# Patient Record
Sex: FEMALE | Race: WHITE | Employment: FULL TIME | URBAN - METROPOLITAN AREA
[De-identification: names, ages, dates, MRNs, and addresses within clinical notes are randomized per-mention and may not be internally consistent; named-entity substitution may affect disease eponyms.]

---

## 2022-02-15 ENCOUNTER — HOSPITAL ENCOUNTER (OUTPATIENT)
Dept: CT IMAGING | Age: 22
Discharge: HOME | End: 2022-02-15
Payer: COMMERCIAL

## 2022-02-15 DIAGNOSIS — R11.10 VOMITING, INTRACTABILITY OF VOMITING NOT SPECIFIED, PRESENCE OF NAUSEA NOT SPECIFIED, UNSPECIFIED VOMITING TYPE: ICD-10-CM

## 2022-02-15 DIAGNOSIS — R10.31 RIGHT LOWER QUADRANT ABDOMINAL PAIN: ICD-10-CM

## 2022-02-15 PROCEDURE — 6360000004 HC RX CONTRAST MEDICATION: Performed by: EMERGENCY MEDICINE

## 2022-02-15 PROCEDURE — 74177 CT ABD & PELVIS W/CONTRAST: CPT

## 2022-02-15 RX ADMIN — IOHEXOL 50 ML: 240 INJECTION, SOLUTION INTRATHECAL; INTRAVASCULAR; INTRAVENOUS; ORAL at 14:34

## 2022-02-15 RX ADMIN — IOPAMIDOL 75 ML: 755 INJECTION, SOLUTION INTRAVENOUS at 14:35

## 2022-03-10 ENCOUNTER — OFFICE VISIT (OUTPATIENT)
Dept: FAMILY MEDICINE CLINIC | Age: 22
End: 2022-03-10
Payer: COMMERCIAL

## 2022-03-10 VITALS
DIASTOLIC BLOOD PRESSURE: 76 MMHG | HEART RATE: 85 BPM | SYSTOLIC BLOOD PRESSURE: 118 MMHG | OXYGEN SATURATION: 95 % | WEIGHT: 198.2 LBS | HEIGHT: 63 IN | BODY MASS INDEX: 35.12 KG/M2

## 2022-03-10 DIAGNOSIS — Z76.89 ENCOUNTER TO ESTABLISH CARE: Primary | ICD-10-CM

## 2022-03-10 DIAGNOSIS — Z00.00 HEALTHCARE MAINTENANCE: ICD-10-CM

## 2022-03-10 DIAGNOSIS — Z86.69 HX OF MIGRAINES: ICD-10-CM

## 2022-03-10 PROCEDURE — 99385 PREV VISIT NEW AGE 18-39: CPT | Performed by: NURSE PRACTITIONER

## 2022-03-10 SDOH — ECONOMIC STABILITY: HOUSING INSECURITY: IN THE LAST 12 MONTHS, HOW MANY PLACES HAVE YOU LIVED?: 2

## 2022-03-10 SDOH — ECONOMIC STABILITY: FOOD INSECURITY: WITHIN THE PAST 12 MONTHS, YOU WORRIED THAT YOUR FOOD WOULD RUN OUT BEFORE YOU GOT MONEY TO BUY MORE.: NEVER TRUE

## 2022-03-10 SDOH — ECONOMIC STABILITY: INCOME INSECURITY: IN THE LAST 12 MONTHS, WAS THERE A TIME WHEN YOU WERE NOT ABLE TO PAY THE MORTGAGE OR RENT ON TIME?: NO

## 2022-03-10 SDOH — ECONOMIC STABILITY: HOUSING INSECURITY
IN THE LAST 12 MONTHS, WAS THERE A TIME WHEN YOU DID NOT HAVE A STEADY PLACE TO SLEEP OR SLEPT IN A SHELTER (INCLUDING NOW)?: NO

## 2022-03-10 SDOH — ECONOMIC STABILITY: TRANSPORTATION INSECURITY
IN THE PAST 12 MONTHS, HAS THE LACK OF TRANSPORTATION KEPT YOU FROM MEDICAL APPOINTMENTS OR FROM GETTING MEDICATIONS?: NO

## 2022-03-10 SDOH — ECONOMIC STABILITY: FOOD INSECURITY: WITHIN THE PAST 12 MONTHS, THE FOOD YOU BOUGHT JUST DIDN'T LAST AND YOU DIDN'T HAVE MONEY TO GET MORE.: NEVER TRUE

## 2022-03-10 SDOH — ECONOMIC STABILITY: TRANSPORTATION INSECURITY
IN THE PAST 12 MONTHS, HAS LACK OF TRANSPORTATION KEPT YOU FROM MEETINGS, WORK, OR FROM GETTING THINGS NEEDED FOR DAILY LIVING?: NO

## 2022-03-10 ASSESSMENT — ENCOUNTER SYMPTOMS
GASTROINTESTINAL NEGATIVE: 1
EYES NEGATIVE: 1
RESPIRATORY NEGATIVE: 1

## 2022-03-10 ASSESSMENT — PATIENT HEALTH QUESTIONNAIRE - PHQ9
1. LITTLE INTEREST OR PLEASURE IN DOING THINGS: 0
2. FEELING DOWN, DEPRESSED OR HOPELESS: 0
SUM OF ALL RESPONSES TO PHQ QUESTIONS 1-9: 0
SUM OF ALL RESPONSES TO PHQ9 QUESTIONS 1 & 2: 0
SUM OF ALL RESPONSES TO PHQ QUESTIONS 1-9: 0

## 2022-03-10 ASSESSMENT — SOCIAL DETERMINANTS OF HEALTH (SDOH): HOW HARD IS IT FOR YOU TO PAY FOR THE VERY BASICS LIKE FOOD, HOUSING, MEDICAL CARE, AND HEATING?: NOT HARD AT ALL

## 2022-03-10 NOTE — PROGRESS NOTES
3/10/2022    Elisa Gonzalez (:  2000) is a 24 y.o. female, here for a preventive medicine evaluation. Pt is here to establish care as a new pt. No complaints. Recently moved here from [place] 10 months ago. , no children. Works as a baker. Vapes. Drinks occasionally. Denies drug use. Denies family h/o colon cancer, breast cancer or heart disease. Sexually active. No using protection. Needs a new OB/Gyn. H/o migraines. Taking sumatriptan as needed. Requesting a new referral to Neurology. Patient Active Problem List   Diagnosis    Hx of migraines       Review of Systems   Constitutional: Negative. HENT: Negative. Eyes: Negative. Respiratory: Negative. Cardiovascular: Negative. Gastrointestinal: Negative. Genitourinary: Negative. Musculoskeletal: Negative. Skin: Negative. Neurological: Negative. Psychiatric/Behavioral: Negative. Prior to Visit Medications    Medication Sig Taking? Authorizing Provider   SUMATRIPTAN-ONDANSETRON PO Take by mouth Takes as needed for migraines, unsure of mg Yes Historical Provider, MD   Levalbuterol Tartrate (Madhu Inch HFA IN) Inhale into the lungs Yes Historical Provider, MD        No Known Allergies    No past medical history on file. History reviewed. No pertinent surgical history.     Social History     Socioeconomic History    Marital status:      Spouse name: Not on file    Number of children: Not on file    Years of education: Not on file    Highest education level: Not on file   Occupational History    Not on file   Tobacco Use    Smoking status: Former Smoker     Quit date: 2018     Years since quittin.1    Smokeless tobacco: Never Used   Vaping Use    Vaping Use: Every day    Substances: Nicotine   Substance and Sexual Activity    Alcohol use: Not on file    Drug use: Not on file    Sexual activity: Not on file   Other Topics Concern    Not on file   Social History Narrative    Not on file     Social Determinants of Health     Financial Resource Strain: Low Risk     Difficulty of Paying Living Expenses: Not hard at all   Food Insecurity: No Food Insecurity    Worried About Running Out of Food in the Last Year: Never true    [address] in the Last Year: Never true   Transportation Needs: No Transportation Needs    Lack of Transportation (Medical): No    Lack of Transportation (Non-Medical): No   Physical Activity:     Days of Exercise per Week: Not on file    Minutes of Exercise per Session: Not on file   Stress:     Feeling of Stress : Not on file   Social Connections:     Frequency of Communication with Friends and Family: Not on file    Frequency of Social Gatherings with Friends and Family: Not on file    Attends Denominational Services: Not on file    Active Member of [address] [place] [address] [employer] or Organizations: Not on file    Attends Club or Organization Meetings: Not on file    Marital Status: Not on file   Intimate Partner Violence:     Fear of Current or Ex-Partner: Not on file    Emotionally Abused: Not on file    Physically Abused: Not on file    Sexually Abused: Not on file   Housing Stability: [address] Unable to Pay for Housing in the Last Year: No    Number of [place] in the Last Year: 2    Unstable Housing in the Last Year: No        Family History   Problem Relation Age of Onset    Other Mother         migraines    Hypertension Mother     Cancer Maternal Grandmother         skin       ADVANCE DIRECTIVE: N, <no information>    Vitals:    03/10/22 1355   BP: 118/76   Site: Left Upper Arm   Position: Sitting   Cuff Size: Medium Adult   Pulse: 85   SpO2: 95%   Weight: 198 lb 3.2 oz (89.9 kg)   Height: 5' 2.5\" (1.588 m)     Estimated body mass index is 35.67 kg/m² as calculated from the following:    Height as of this encounter: 5' 2.5\" (1.588 m). Weight as of this encounter: 198 lb 3.2 oz (89.9 kg). Physical Exam  Vitals reviewed. Constitutional:       Appearance: Normal appearance. She is well-developed. She is not ill-appearing. HENT:      Head: Normocephalic. Eyes:      General: Lids are normal.         Right eye: No discharge. Left eye: No discharge. Neck:      Thyroid: No thyroid mass, thyromegaly or thyroid tenderness. Vascular: Normal carotid pulses. No carotid bruit or JVD. Cardiovascular:      Rate and Rhythm: Normal rate and regular rhythm. Pulses: Normal pulses. Heart sounds: Normal heart sounds. No murmur heard. Pulmonary:      Effort: Pulmonary effort is normal.      Breath sounds: Normal breath sounds. Musculoskeletal:         General: Normal range of motion. Cervical back: Full passive range of motion without pain and normal range of motion. No edema or crepitus. No pain with movement. Normal range of motion. Lymphadenopathy:      Cervical: No cervical adenopathy. Right cervical: No superficial cervical adenopathy. Left cervical: No superficial cervical adenopathy. Skin:     General: Skin is warm and dry. Capillary Refill: Capillary refill takes less than 2 seconds. Neurological:      General: No focal deficit present. Mental Status: She is alert and oriented to person, place, and time. Psychiatric:         Mood and Affect: Mood normal.         Behavior: Behavior normal. Behavior is cooperative. Thought Content: Thought content normal.         Judgment: Judgment normal.         No flowsheet data found. Lab Results   Component Value Date    GLUCOSE 80 03/10/2022       The ASCVD Risk score (Grace Reyes., et al., 2013) failed to calculate for the following reasons:     The 2013 ASCVD risk score is only valid for ages 36 to 78    Immunization History   Administered Date(s) Administered    COVID-19, Pfizer Purple top, DILUTE for use, 12+ yrs, 30mcg/0.3mL dose 01/28/2022       Health Maintenance   Topic Date Due    Hepatitis C screen  Never done   Randi Baker Varicella vaccine (1 of 2 - 2-dose childhood series) Never done    HPV vaccine (1 - 2-dose series) Never done    HIV screen  Never done    Chlamydia screen  Never done    DTaP/Tdap/Td vaccine (1 - Tdap) Never done    Pap smear  Never done    Flu vaccine (1) Never done    COVID-19 Vaccine (2 - Pfizer 3-dose series) 02/18/2022    Depression Screen  03/10/2023    Hepatitis A vaccine  Aged Out    Hepatitis B vaccine  Aged Out    Hib vaccine  Aged Out    Meningococcal (ACWY) vaccine  Aged Out    Pneumococcal 0-64 years Vaccine  Aged Out       Assessment & Plan   Encounter to [address] [place] [address], Michael Holbrook DO, Obstetrics, [facility]  -     CBC with Auto Differential  -     Comprehensive Metabolic Panel w/ Reflex to MG  -     T4, Free  -     TSH  Hx of migraines  -     Praveen Neri MD, Neurology, [facility]    Return in about 1 year (around 3/10/2023).          --Louis Maldonado, GALLO - CNP

## 2022-03-11 ENCOUNTER — TELEPHONE (OUTPATIENT)
Dept: FAMILY MEDICINE CLINIC | Age: 22
End: 2022-03-11

## 2022-03-11 LAB
A/G RATIO: 2.5 (ref 1.1–2.2)
ALBUMIN SERPL-MCNC: 4.7 G/DL (ref 3.4–5)
ALP BLD-CCNC: 54 U/L (ref 40–129)
ALT SERPL-CCNC: 17 U/L (ref 10–40)
ANION GAP SERPL CALCULATED.3IONS-SCNC: 16 MMOL/L (ref 3–16)
AST SERPL-CCNC: 15 U/L (ref 15–37)
BASOPHILS ABSOLUTE: 0.1 K/UL (ref 0–0.2)
BASOPHILS RELATIVE PERCENT: 1 %
BILIRUB SERPL-MCNC: 0.3 MG/DL (ref 0–1)
BUN BLDV-MCNC: 12 MG/DL (ref 7–20)
CALCIUM SERPL-MCNC: 9.7 MG/DL (ref 8.3–10.6)
CHLORIDE BLD-SCNC: 103 MMOL/L (ref 99–110)
CO2: 21 MMOL/L (ref 21–32)
CREAT SERPL-MCNC: 0.5 MG/DL (ref 0.6–1.1)
EOSINOPHILS ABSOLUTE: 0 K/UL (ref 0–0.6)
EOSINOPHILS RELATIVE PERCENT: 0 %
GFR AFRICAN AMERICAN: >60
GFR NON-AFRICAN AMERICAN: >60
GLUCOSE BLD-MCNC: 80 MG/DL (ref 70–99)
HCT VFR BLD CALC: 38.7 % (ref 36–48)
HEMOGLOBIN: 12.8 G/DL (ref 12–16)
LYMPHOCYTES ABSOLUTE: 2.7 K/UL (ref 1–5.1)
LYMPHOCYTES RELATIVE PERCENT: 34 %
MCH RBC QN AUTO: 30.2 PG (ref 26–34)
MCHC RBC AUTO-ENTMCNC: 33.1 G/DL (ref 31–36)
MCV RBC AUTO: 91.2 FL (ref 80–100)
MONOCYTES ABSOLUTE: 0.2 K/UL (ref 0–1.3)
MONOCYTES RELATIVE PERCENT: 3 %
NEUTROPHILS ABSOLUTE: 5 K/UL (ref 1.7–7.7)
NEUTROPHILS RELATIVE PERCENT: 62 %
PDW BLD-RTO: 13.6 % (ref 12.4–15.4)
PLATELET # BLD: 215 K/UL (ref 135–450)
PLATELET SLIDE REVIEW: ADEQUATE
PMV BLD AUTO: 8.5 FL (ref 5–10.5)
POTASSIUM REFLEX MAGNESIUM: 3.9 MMOL/L (ref 3.5–5.1)
RBC # BLD: 4.24 M/UL (ref 4–5.2)
SLIDE REVIEW: NORMAL
SODIUM BLD-SCNC: 140 MMOL/L (ref 136–145)
T4 TOTAL: 5.6 UG/DL (ref 4.5–10.9)
TOTAL PROTEIN: 6.6 G/DL (ref 6.4–8.2)
TSH SERPL DL<=0.05 MIU/L-ACNC: 0.85 UIU/ML (ref 0.27–4.2)
WBC # BLD: 8 K/UL (ref 4–11)

## 2022-03-11 NOTE — TELEPHONE ENCOUNTER
Left VM for patient to call back. Did she get her lab results yet? There's this message and a result note about it.

## 2022-04-19 ENCOUNTER — OFFICE VISIT (OUTPATIENT)
Dept: OBGYN CLINIC | Age: 22
End: 2022-04-19
Payer: COMMERCIAL

## 2022-04-19 VITALS
HEART RATE: 76 BPM | WEIGHT: 205 LBS | SYSTOLIC BLOOD PRESSURE: 130 MMHG | DIASTOLIC BLOOD PRESSURE: 80 MMHG | BODY MASS INDEX: 36.9 KG/M2 | TEMPERATURE: 97.7 F

## 2022-04-19 DIAGNOSIS — N92.0 MENORRHAGIA WITH REGULAR CYCLE: ICD-10-CM

## 2022-04-19 DIAGNOSIS — Z12.4 PAP SMEAR FOR CERVICAL CANCER SCREENING: ICD-10-CM

## 2022-04-19 DIAGNOSIS — Z01.419 ENCOUNTER FOR ANNUAL ROUTINE GYNECOLOGICAL EXAMINATION: Primary | ICD-10-CM

## 2022-04-19 PROCEDURE — 99385 PREV VISIT NEW AGE 18-39: CPT | Performed by: OBSTETRICS & GYNECOLOGY

## 2022-04-19 RX ORDER — CHLORAL HYDRATE 500 MG
3000 CAPSULE ORAL DAILY
COMMUNITY
End: 2022-08-01 | Stop reason: ALTCHOICE

## 2022-04-19 NOTE — PROGRESS NOTES
Annual Exam      CC:   Chief Complaint   Patient presents with    New Patient    Gynecologic Exam       HPI:  24 y.o. Jaye Lowery presents for her gynecologic annual exam. No concerns, complaints. Does report pretty heavy menses, says she stopped BCM because she was having a lot of undesired side effects. Not interested in getting back on it as she may be considering pregnancy soon. Patient seen and examined. Review of Systems:   Review of Systems   Constitutional: Negative for chills and fever. Respiratory: Negative for shortness of breath. Cardiovascular: Negative for chest pain. Gastrointestinal: Negative for abdominal pain, constipation, diarrhea, nausea and vomiting. Genitourinary: Negative for difficulty urinating, dysuria, menstrual problem, vaginal bleeding and vaginal discharge. Primary Care Physician: GALLO Hurd - CNP    Obstetric History  OB History    Para Term  AB Living   0 0 0 0 0 0   SAB IAB Ectopic Molar Multiple Live Births   0 0 0 0 0 0       Gynecologic History  Menstrual History:   LMP: 2022   Menstrual pattern: q28-30d, 7d, moderate flow, +clots, no cramps  Sexual History:   Contraception: none, previously used nexplanon but discontinued (has been off for 1.5 years)   Currently is sexually active   Remote history of STIs - remote h/o chlamydia   No sexual problems, some occasional pain  Pap History:   Last pap smear: , reports results as abnormal   History of abnormal pap smears: yes, abnormal in  per pt report    Medical History:  Past Medical History:   Diagnosis Date    Abnormal Pap smear of cervix 2020    Asthma        Surgical History:  History reviewed. No pertinent surgical history.     Medications:  Current Outpatient Medications   Medication Sig Dispense Refill    Multiple Vitamins-Minerals (WOMENS MULTIVITAMIN PO) Take by mouth daily      Omega-3 Fatty Acids (FISH OIL) 1000 MG CAPS Take 3,000 mg by mouth daily      SUMATRIPTAN-ONDANSETRON PO Take by mouth Takes as needed for migraines, unsure of mg      Levalbuterol Tartrate (XOPENEX HFA IN) Inhale into the lungs (Patient not taking: Reported on 2022)       No current facility-administered medications for this visit. Allergies:  No Known Allergies    Social History:  Social History     Socioeconomic History    Marital status:      Spouse name: None    Number of children: None    Years of education: None    Highest education level: None   Occupational History    None   Tobacco Use    Smoking status: Former Smoker     Quit date:      Years since quittin.2    Smokeless tobacco: Never Used   Vaping Use    Vaping Use: Every day    Substances: Nicotine   Substance and Sexual Activity    Alcohol use: Yes     Comment: socially    Drug use: Never    Sexual activity: None   Other Topics Concern    None   Social History Narrative    None     Social Determinants of Health     Financial Resource Strain: Low Risk     Difficulty of Paying Living Expenses: Not hard at all   Food Insecurity: No Food Insecurity    Worried About Running Out of Food in the Last Year: Never true    [address] in the Last Year: Never true   Transportation Needs: No Transportation Needs    Lack of Transportation (Medical): No    Lack of Transportation (Non-Medical):  No   Physical Activity:     Days of Exercise per Week: Not on file    Minutes of Exercise per Session: Not on file   Stress:     Feeling of Stress : Not on file   Social Connections:     Frequency of Communication with Friends and Family: Not on file    Frequency of Social Gatherings with Friends and Family: Not on file    Attends Shinto Services: Not on file    Active Member of Clubs or Organizations: Not on file    Attends Club or Organization Meetings: Not on file    Marital Status: Not on file   Intimate Partner Violence:     Fear of Current or Ex-Partner: Not on file   [employer] Abused: Not on file    Physically Abused: Not on file    Sexually Abused: Not on file   Housing Stability: Low Risk     Unable to Pay for Housing in the Last Year: No    Number of Places Lived in the Last Year: 2    Unstable Housing in the Last Year: No       Family History:  Family History   Problem Relation Age of Onset    Other Mother         migraines    Hypertension Mother     Cancer Maternal Grandmother         skin     See above, otherwise denies personal/family history of cervical, uterine, ovarian, vulvar, breast, or colon cancers. Objective: Body mass index is 36.9 kg/m². /80 (Site: Left Upper Arm, Position: Sitting, Cuff Size: Medium Adult)   Pulse 76   Temp 97.7 °F (36.5 °C) (Infrared)   Wt 205 lb (93 kg)   LMP 04/07/2022 (Exact Date)   BMI 36.90 kg/m²     Exam:   Physical Exam  Exam conducted with a chaperone present. Constitutional:       Appearance: She is well-developed. HENT:      Head: Normocephalic and atraumatic. Cardiovascular:      Rate and Rhythm: Normal rate and regular rhythm. Pulmonary:      Effort: Pulmonary effort is normal. No respiratory distress. Chest:   Breasts:      Right: Normal. No mass, nipple discharge or skin change. Left: Normal. No mass, nipple discharge or skin change. Abdominal:      General: There is no distension. Palpations: Abdomen is soft. Tenderness: There is no abdominal tenderness. There is no guarding or rebound. Genitourinary:     Comments: Pelvic exam: VULVA: normal appearing vulva with no masses, tenderness or lesions, VAGINA: normal appearing vagina with normal color and discharge, no lesions, CERVIX: normal appearing cervix without discharge or lesions, UTERUS: uterus is normal size, shape, consistency and nontender, ADNEXA: normal adnexa in size, nontender and no masses. Musculoskeletal:      Cervical back: Normal range of motion.    Neurological:      Mental Status: She is alert and oriented to person, place, and time. Assessment/Plan:  24 y.o. Suzan Coppola presenting for her annual exam:    1. Encounter for annual routine gynecological examination  Discussed age appropriate screening recommendations, pap smear sent today. Discussed breast self awareness, STI screening deferred. - PAP SMEAR    2. Pap smear for cervical cancer screening  - PAP SMEAR    3. Menorrhagia with regular cycle  Patient with heavy cycles, briefly reviewed options for management and pt declines. Has previously tried various BCM options but had side effects, also considering pregnancy. Discussed return precautions, will start PNV, and call for any concerning bleeding.       Dispo: PRN or for annual exam  Yolanda Atwood MD

## 2022-04-20 ASSESSMENT — ENCOUNTER SYMPTOMS
NAUSEA: 0
DIARRHEA: 0
VOMITING: 0
ABDOMINAL PAIN: 0
SHORTNESS OF BREATH: 0
CONSTIPATION: 0

## 2022-06-28 ENCOUNTER — OFFICE VISIT (OUTPATIENT)
Dept: FAMILY MEDICINE CLINIC | Age: 22
End: 2022-06-28
Payer: COMMERCIAL

## 2022-06-28 VITALS
OXYGEN SATURATION: 99 % | WEIGHT: 203 LBS | BODY MASS INDEX: 36.54 KG/M2 | DIASTOLIC BLOOD PRESSURE: 68 MMHG | SYSTOLIC BLOOD PRESSURE: 120 MMHG | HEART RATE: 88 BPM

## 2022-06-28 DIAGNOSIS — K13.70 ORAL LESION: Primary | ICD-10-CM

## 2022-06-28 PROCEDURE — 99213 OFFICE O/P EST LOW 20 MIN: CPT | Performed by: NURSE PRACTITIONER

## 2022-06-28 NOTE — PROGRESS NOTES
2022     Rowena Mooney (:  2000) is a 20 y.o. female, here for evaluation of the following medical concerns:    HPI  Pt is here today for further evaluation of a \"bump\" that she has had on her lower lip since she was 2years old. States that her dentist referred her to a \"specialist\", but they were not covered under her insurance. Denies pain, but states that she is \"tired of people staring at her\". She states that she tried to cut it off with a razor blade when she was 13 and it bled for 2 days. Review of Systems   All other systems reviewed and are negative. Prior to Visit Medications    Medication Sig Taking? Authorizing Provider   Multiple Vitamins-Minerals (WOMENS MULTIVITAMIN PO) Take by mouth daily Yes Historical Provider, MD   Omega-3 Fatty Acids (FISH OIL) 1000 MG CAPS Take 3,000 mg by mouth daily Yes Historical Provider, MD   SUMATRIPTAN-ONDANSETRON PO Take by mouth Takes as needed for migraines, unsure of mg Yes Historical Provider, MD   Levalbuterol Tartrate (Maeve Handy HFA IN) Inhale into the lungs  Patient not taking: Reported on 2022  Historical Provider, MD        Social History     Tobacco Use    Smoking status: Former Smoker     Quit date: 2018     Years since quittin.4    Smokeless tobacco: Never Used   Substance Use Topics    Alcohol use: Yes     Comment: socially        Vitals:    22 1621   BP: 120/68   Site: Right Upper Arm   Position: Sitting   Cuff Size: Large Adult   Pulse: 88   SpO2: 99%   Weight: 203 lb (92.1 kg)     Estimated body mass index is 36.54 kg/m² as calculated from the following:    Height as of 3/10/22: 5' 2.5\" (1.588 m). Weight as of this encounter: 203 lb (92.1 kg). Physical Exam  Vitals reviewed. HENT:      Head: Normocephalic and atraumatic. Pulmonary:      Effort: Pulmonary effort is normal.   Skin:     General: Skin is warm and dry.       Comments: See picture   Neurological:      Mental Status: She is alert and oriented to person, place, and time. Psychiatric:         Mood and Affect: Mood normal.         Behavior: Behavior normal.         Thought Content: Thought content normal.         Judgment: Judgment normal.       Media Information             Document Information    Dermatology Image:  Dermatology Photo      06/28/2022 16:37   Attached To: Office Visit on 6/28/22 with [facility]ALLO Bridges [facility]  [facility]         ASSESSMENT/PLAN:  1. Oral lesion  Advised pt to f/u with ENT for further evaluation. May need referral to a plastic surgeon if this is something that they can not see her for. - Audrey Adler MD, Otolaryngology, [facility]      Return if symptoms worsen or fail to improve. An electronic signature was used to authenticate this note.     --GALLO Bridges CNP on 6/28/2022 at 4:57 PM

## 2022-06-28 NOTE — Clinical Note
Hi there,   Please see my note and picture. Is this something that you could see her for, or does she need to be referred to a plastic surgeon?     Thank you for your time,   Rj Phipps, CNP

## 2022-07-05 ENCOUNTER — OFFICE VISIT (OUTPATIENT)
Dept: ENT CLINIC | Age: 22
End: 2022-07-05
Payer: COMMERCIAL

## 2022-07-05 VITALS — BODY MASS INDEX: 35.97 KG/M2 | HEIGHT: 63 IN | WEIGHT: 203 LBS | TEMPERATURE: 98.4 F

## 2022-07-05 DIAGNOSIS — K13.0 LIP PAIN: ICD-10-CM

## 2022-07-05 DIAGNOSIS — K13.0 LIP LESION: Primary | ICD-10-CM

## 2022-07-05 PROCEDURE — 99202 OFFICE O/P NEW SF 15 MIN: CPT | Performed by: STUDENT IN AN ORGANIZED HEALTH CARE EDUCATION/TRAINING PROGRAM

## 2022-07-05 PROCEDURE — 11440 EXC FACE-MM B9+MARG 0.5 CM/<: CPT | Performed by: STUDENT IN AN ORGANIZED HEALTH CARE EDUCATION/TRAINING PROGRAM

## 2022-07-05 ASSESSMENT — ENCOUNTER SYMPTOMS
VOMITING: 0
COUGH: 0
NAUSEA: 0
RHINORRHEA: 0
EYE PAIN: 0
SHORTNESS OF BREATH: 0

## 2022-07-05 NOTE — PROGRESS NOTES
[facility]      Patient Name: Nancy Pollard  Medical Record Number:  [id number]  Primary Care Physician:  GALLO Mullins - CNP  Date of Consultation: 2022    Chief Complaint:   Chief Complaint   Patient presents with    New Patient     Patient has a bump on her left lower lip that has been there since the age of Kaevu 89 is a(n) 20 y.o. female who presents for evaluation of a lip lesion. She states that she had trauma when she was a child and it has been there since. She believes that she had the lesion at the age of 1. It has not significantly grown in size. Its does not bleed and is painful at times and she is concerned about potential malignancy that it is now painful. Patient Active Problem List   Diagnosis    Hx of migraines     History reviewed. No pertinent surgical history.   Family History   Problem Relation Age of Onset    Other Mother         migraines    Hypertension Mother     Cancer Maternal Grandmother         skin     Social History     Socioeconomic History    Marital status:      Spouse name: Not on file    Number of children: Not on file    Years of education: Not on file    Highest education level: Not on file   Occupational History    Not on file   Tobacco Use    Smoking status: Former Smoker     Quit date: 2018     Years since quittin.5    Smokeless tobacco: Never Used   Vaping Use    Vaping Use: Every day    Substances: Nicotine   Substance and Sexual Activity    Alcohol use: Yes     Comment: socially    Drug use: Never    Sexual activity: Not on file   Other Topics Concern    Not on file   Social History Narrative    Not on file     Social Determinants of Health     Financial Resource Strain: Low Risk     Difficulty of Paying Living Expenses: Not hard at all   Food Insecurity: No Food Insecurity    Worried About [address] in the Last Year: Never true    Ran Out of Food in the Last Year: Never true   Transportation Needs: No Transportation Needs    Lack of Transportation (Medical): No    Lack of Transportation (Non-Medical): No   Physical Activity:     Days of Exercise per Week: Not on file    Minutes of Exercise per Session: Not on file   Stress:     Feeling of Stress : Not on file   Social Connections:     Frequency of Communication with Friends and Family: Not on file    Frequency of Social Gatherings with Friends and Family: Not on file    Attends Spiritism Services: Not on file    Active Member of [address] [place] [address] [employer] or Organizations: Not on file    Attends Club or Organization Meetings: Not on file    Marital Status: Not on file   Intimate Partner Violence:     Fear of Current or Ex-Partner: Not on file    Emotionally Abused: Not on file    Physically Abused: Not on file    Sexually Abused: Not on file   Housing Stability: [address] Unable to Pay for Housing in the Last Year: No    Number of [place] in the Last Year: 2    Unstable Housing in the Last Year: No       DRUG/FOOD ALLERGIES: Patient has no known allergies. CURRENT MEDICATIONS  Prior to Admission medications    Medication Sig Start Date End Date Taking? Authorizing Provider   Multiple Vitamins-Minerals (WOMENS MULTIVITAMIN PO) Take by mouth daily   Yes Historical Provider, MD   Omega-3 Fatty Acids (FISH OIL) 1000 MG CAPS Take 3,000 mg by mouth daily   Yes Historical Provider, MD   SUMATRIPTAN-ONDANSETRON PO Take by mouth Takes as needed for migraines, unsure of mg   Yes Historical Provider, MD   Levalbuterol Tartrate ([facility] HFA IN) Inhale into the lungs    Yes Historical Provider, MD       REVIEW OF SYSTEMS  The following systems were reviewed and revealed the following in addition to any already discussed in the HPI:    Review of Systems   Constitutional: Negative for fatigue and fever.    HENT: Negative for congestion, ear pain, postnasal drip, rhinorrhea and sneezing. Lip lesion   Eyes: Negative for pain and visual disturbance. Respiratory: Negative for cough and shortness of breath. Cardiovascular: Negative for chest pain. Gastrointestinal: Negative for nausea and vomiting. Endocrine: Negative. Genitourinary: Negative. Musculoskeletal: Negative for neck pain and neck stiffness. Skin: Negative for rash. Neurological: Negative for dizziness and headaches. PHYSICAL EXAM  Temp 98.4 °F (36.9 °C)   Ht 5' 2.5\" (1.588 m)   Wt 203 lb (92.1 kg)   BMI 36.54 kg/m²     GENERAL: No Acute Distress, Alert and Oriented, no hoarseness  EYES: EOMI, Anti-icteric  NOSE: No epistaxis, nasal mucosa within normal limits, no purulent drainage  EARS: Normal external canal appearance, EAC patent bilaterally  FACE: 1/6 House-Brackmann Scale, symmetric, sensation equal bilaterally  ORAL CAVITY: No masses or lesions palpated, uvula is midline, moist mucous membranes, 0.5 mm circular lesion of the red lower lip  NECK: Normal range of motion, no thyromegaly, trachea is midline, no lymphadenopathy, no neck masses, no crepitus  CHEST: Normal respiratory effort, no retractions, breathing comfortably  SKIN: No rashes, normal appearing skin, no evidence of skin lesions/tumors    RADIOLOGY  Summary of findings:      PROCEDURE  Head/Neck Biopsy     Pre op:  Lesion of the lip  Post op: Same  Procedure:  Removal of lip lesion  Surgeon: Edward Mendoza DO  Anesthesia: 1% lidocaine with epinephrine 1:100,000; 1 cc used  Estimated Blood Loss: Minimal  The patient was placed in the examination chair in upright position. Risks and benefits of the procedure including pain, infection, inflammation, hemorrhage/hematoma were outlined. Local anesthesia was infiltrated over the  left lower lid with blanching appreciated. We waited 10 minutes to allow for adequate vasoconstriction. An 11 blade was used to make an elliptical incision taking care not to cross the vermilion border.   The lesion was removed and was placed into formalin for pathological evaluation. The site was closed with 4-0 chromic suture in interrupted fashion. *Patient tolerated the procedure well with no complications  *Patient instructed to place antibiotic ointment over the site three times daily for the next 4-5 days, then use vaseline      ASSESSMENT/PLAN  Hedy Nolan is a very pleasant 20 y.o. female with     1. Lip lesion  Lesion was removed in the office. She will follow-up in 1 week for progress check and results. - Surgical Pathology  - 29068 - MO EXC SKIN BENIG <5MM FACE,FACIAL    2. Lip pain  The lesion of the lip was painful and we wanted. Removal would remedy the pain and ensure no malignancy was present. Follow-up in 1 week. Medical Decision Making:   The following items were considered in medical decision making:  Independent review of images  Review / order clinical lab tests  Review / order radiology tests  Decision to obtain old records

## 2022-07-08 ENCOUNTER — TELEPHONE (OUTPATIENT)
Dept: ENT CLINIC | Age: 22
End: 2022-07-08

## 2022-07-08 NOTE — TELEPHONE ENCOUNTER
Call patient with results of biopsy she will follow-up next week for progress check. Lip lesion, punch biopsy:      - Intradermal melanocytic nevus, traumatized.

## 2022-08-01 ENCOUNTER — INITIAL CONSULT (OUTPATIENT)
Dept: NEUROLOGY | Age: 22
End: 2022-08-01
Payer: COMMERCIAL

## 2022-08-01 VITALS
OXYGEN SATURATION: 97 % | BODY MASS INDEX: 36.86 KG/M2 | HEART RATE: 74 BPM | HEIGHT: 63 IN | SYSTOLIC BLOOD PRESSURE: 136 MMHG | DIASTOLIC BLOOD PRESSURE: 90 MMHG | WEIGHT: 208 LBS

## 2022-08-01 DIAGNOSIS — G43.111 INTRACTABLE MIGRAINE WITH AURA WITH STATUS MIGRAINOSUS: Primary | ICD-10-CM

## 2022-08-01 DIAGNOSIS — G44.221 CHRONIC TENSION-TYPE HEADACHE, INTRACTABLE: ICD-10-CM

## 2022-08-01 PROCEDURE — 99204 OFFICE O/P NEW MOD 45 MIN: CPT | Performed by: PSYCHIATRY & NEUROLOGY

## 2022-08-01 RX ORDER — PROPRANOLOL HYDROCHLORIDE 10 MG/1
10 TABLET ORAL 3 TIMES DAILY
Qty: 90 TABLET | Refills: 2 | Status: SHIPPED | OUTPATIENT
Start: 2022-08-01

## 2022-08-01 RX ORDER — RIZATRIPTAN BENZOATE 10 MG/1
10 TABLET, ORALLY DISINTEGRATING ORAL PRN
Qty: 9 TABLET | Refills: 2 | Status: SHIPPED | OUTPATIENT
Start: 2022-08-01

## 2022-08-01 NOTE — PROGRESS NOTES
The patient is a 20y.o. years old female who  was referred by GALLO Rodgers -Delon for consultation regarding chronic migraines. HPI:  The patient describes hx of chronic migraines since her teens. She has strong family history of migraines. Description: Weekly episode of unilateral pulsating and throbbing pain with nausea, photophobia and phonophobia. Frequency 1-2 a week or 5/month. Duration hours. Degree is severe. No other relieving or aggravating factors or triggers. She tried most of OTC without improvement. She tried sumatriptan in the past which caused excessive sedation. She was also on Topamax 5 years ago which caused unusual episodes and spells of confusion. She is currently not taking any preventative medications. No weakness or numbness or visual changes. No sleep insomnia, depression or recent stressors. She has occasional daily headaches in between her migraines. Degree is mild to moderate but persistent and frequency is daily. Location holocranial.  Other review of system was unremarkable. ROS : A 10-14 system review of constitutional, cardiovascular, respiratory, GI, eyes, , ENT, musculoskeletal, endocrine, skin, SHEENT, genitourinary, psychiatric and neurologic systems was obtained and updated today and is unremarkable except as mentioned in my HPI        Exam:   Constitutional:   Vitals:    08/01/22 1053   BP: (!) 136/90   Site: Left Wrist   Position: Sitting   Pulse: 74   SpO2: 97%   Weight: 208 lb (94.3 kg)   Height: 5' 2.5\" (1.588 m)       General appearance:  Normal development and appear in no acute distress. Mental Status:   Oriented to person, place, problem, and time. Memory: Good immediate recall. Intact remote memory  Normal attention span and concentration. Language: intact naming, repeating and fluency   Good fund of Knowledge. Aware of current events and vocabulary   Cranial Nerves:   II: Visual fields: Full.  Pupils: equal, round, reactive to light  III,IV,VI: Extra Ocular Movements are intact. No nystagmus  V: Facial sensation is intact  VII: Facial strength and movements: intact and symmetric  XII: Tongue movements are normal  Musculoskeletal: 5/5 in all 4 extremities. Tone: Normal tone. Reflexes: Symmetric 2+ in the arms and 2+ in the legs   Coordination: no pronator drift, no dysmetria with FNF and normal REM  Sensation: normal to all modalities in both arms and legs. Gait/Posture: steady gait with normal posturing and station. Medical decision making:  I personally reviewed and updated social history, past medical history, medications, allergy, surgical history, and family history as documented in the patient's electronic health records. Labs and other test results reviewed. Last blood test in March showed unremarkable CBC and CMP. Normal TSH. Reviewed notes from other physicians. Provided patient education regarding risk, benefits and treatment options as well as adherence to medication regimen and side effect from these medications. Assessment:   Diagnosis Orders   1. Intractable migraine with aura with status migrainosus  propranolol (INDERAL) 10 MG tablet    rizatriptan (MAXALT-MLT) 10 MG disintegrating tablet      2.  Chronic tension-type headache, intractable              Chronic intractable migraine with aura  Medication overuse headache  Chronic daily headache    Plan:  Start Inderal 10 mg slowly up to 10 mg 3 times daily  Side effect was discussed with the patient including potential side effect on pregnancy  She voiced understanding  Start Maxalt MLT 10 Mg at the onset of migraine not more than twice a week  Headache diary  Hydration  Avoid excessive coffee or soda  Avoid OTC  Discussed risk of rebound headaches  Will consider neuroimaging with MRI if headaches are not better with above measures  Follow-up 2 to 3 months or sooner if new symptoms arise

## 2022-09-01 ENCOUNTER — TELEPHONE (OUTPATIENT)
Dept: FAMILY MEDICINE CLINIC | Age: 22
End: 2022-09-01

## 2022-09-01 NOTE — TELEPHONE ENCOUNTER
Received phone call from pt, she is asking if we have received the forms that need to be filled out occfit form regarding compression socks and walking shoes that needs to be filled out and emailed back to [facility]. Please advise if you have seen these forms.    [facility] information is:    [phone]  Phone: [phone]

## 2022-09-01 NOTE — TELEPHONE ENCOUNTER
Forms were received and I contacted patient before seeing this message to verify they came from patient. Forms for her and her  were scanned in to their charts, and originals place on PCP desk.

## 2022-09-01 NOTE — TELEPHONE ENCOUNTER
Called patient and made her aware we received paperwork from [address] [place] [address] for authorization request. Asked if she was aware of this and she verified this company came to her and her husbands work and need these forms completed and faxed back for them to be provided with compression socks and orthopedic footwear. Will give paperwork to provider and contact patient when completed.

## 2022-09-16 ENCOUNTER — TELEPHONE (OUTPATIENT)
Dept: FAMILY MEDICINE CLINIC | Age: 22
End: 2022-09-16

## 2022-09-16 NOTE — TELEPHONE ENCOUNTER
Called and spoke with patient and she will contact her company regarding the needed dx codes to complete the [facility] paperwork. Scheduled her and her  for Virtual Visit 9/28/22 to document what dx and then complete paperwork.

## 2022-09-28 ENCOUNTER — TELEMEDICINE (OUTPATIENT)
Dept: FAMILY MEDICINE CLINIC | Age: 22
End: 2022-09-28
Payer: COMMERCIAL

## 2022-09-28 DIAGNOSIS — M79.604 PAIN IN BOTH LOWER EXTREMITIES: Primary | ICD-10-CM

## 2022-09-28 DIAGNOSIS — M54.50 CHRONIC BILATERAL LOW BACK PAIN WITHOUT SCIATICA: ICD-10-CM

## 2022-09-28 DIAGNOSIS — R60.0 LOCALIZED EDEMA: ICD-10-CM

## 2022-09-28 DIAGNOSIS — G89.29 CHRONIC BILATERAL LOW BACK PAIN WITHOUT SCIATICA: ICD-10-CM

## 2022-09-28 DIAGNOSIS — M79.605 PAIN IN BOTH LOWER EXTREMITIES: Primary | ICD-10-CM

## 2022-09-28 PROCEDURE — 99213 OFFICE O/P EST LOW 20 MIN: CPT | Performed by: NURSE PRACTITIONER

## 2022-09-28 ASSESSMENT — ENCOUNTER SYMPTOMS: BACK PAIN: 1

## 2022-09-28 NOTE — PROGRESS NOTES
2022    TELEHEALTH EVALUATION -- Audio/Visual (During [id number]-[id number] public health emergency)    HPI:    Makayla Martinez (:  2000) has requested an audio/video evaluation for the following concern(s):    Pt scheduled VV today to discuss getting bilateral below the knee compression stockings and orthopedic footwear through her employer. 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  States that her job requires her to stand for long periods of time. C/o bilateral leg pain, lower back pain and bilateral ankle/feet swelling after being up on her feet for a long period of time. See scanned forms. Review of Systems   Constitutional: Negative. Cardiovascular:  Positive for leg swelling. Negative for chest pain and palpitations. Musculoskeletal:  Positive for back pain and myalgias. Chronic lower back pain and BLE aching when standing for long periods of time. Hematological: Negative. Prior to Visit Medications    Medication Sig Taking? Authorizing Provider   propranolol (INDERAL) 10 MG tablet Take 1 tablet by mouth in the morning and 1 tablet at noon and 1 tablet before bedtime. Anahi Franks MD   rizatriptan (MAXALT-MLT) 10 MG disintegrating tablet Take 1 tablet by mouth as needed for Migraine May repeat in 2 hours if needed not more than two per 24 hrs.   Anahi Franks MD   Multiple Vitamins-Minerals (WOMENS MULTIVITAMIN PO) Take by mouth daily  Historical Provider, MD   Levalbuterol Tartrate (Lonzie Punch HFA IN) Inhale into the lungs   Historical Provider, MD       Social History     Tobacco Use    Smoking status: Former     Types: Cigarettes     Quit date: 2018     Years since quittin.7    Smokeless tobacco: Never   Vaping Use    Vaping Use: Every day    Substances: Nicotine   Substance Use Topics    Alcohol use: Yes     Comment: socially    Drug use: Never        No Known Allergies,   Past Medical History:   Diagnosis Date    Abnormal Pap smear of cervix 2020    Asthma , No past surgical history on file.,   Social History     Tobacco Use    Smoking status: Former     Types: Cigarettes     Quit date: 2018     Years since quittin.7    Smokeless tobacco: Never   Vaping Use    Vaping Use: Every day    Substances: Nicotine   Substance Use Topics    Alcohol use: Yes     Comment: socially    Drug use: Never   ,   Family History   Problem Relation Age of Onset    Other Mother         migraines    Hypertension Mother     No Known Problems Father     Migraines Sister     Migraines Sister     No Known Problems Brother     Cancer Maternal Grandmother         skin    Diabetes Maternal Grandfather     Celiac Disease Maternal Grandfather     No Known Problems Paternal Grandmother     No Known Problems Paternal Grandfather    ,   Immunization History   Administered Date(s) Administered    COVID-19, PFIZER PURPLE top, DILUTE for use, (age 15 y+), 30mcg/0.3mL 2022    Influenza, FLUCELVAX, (age 10 mo+), MDCK, PF, 0.5mL 2022   ,   Health Maintenance   Topic Date Due    Varicella vaccine (1 of 2 - 2-dose childhood series) Never done    HPV vaccine (1 - 2-dose series) Never done    HIV screen  Never done    Chlamydia/GC screen  Never done    Hepatitis C screen  Never done    DTaP/Tdap/Td vaccine (1 - Tdap) Never done    COVID-19 Vaccine (2 - Pfizer series) 2022    Flu vaccine (1) 2022    Depression Screen  03/10/2023    Pap smear  2025    Hepatitis A vaccine  Aged Out    Hepatitis B vaccine  Aged Out    Hib vaccine  Aged Out    Meningococcal (ACWY) vaccine  Aged Out    Pneumococcal 0-64 years Vaccine  Aged Out       PHYSICAL EXAMINATION:  [ INSTRUCTIONS:  \"[x]\" Indicates a positive item  \"[]\" Indicates a negative item  -- DELETE ALL ITEMS NOT EXAMINED]  Vital Signs: (As obtained by patient/caregiver or practitioner observation)    Blood pressure-  Heart rate-    Respiratory rate-    Temperature-  Pulse oximetry-     Constitutional: [x] Appears well-developed and well-nourished [x] No apparent distress      [] Abnormal-   Mental status  [x] Alert and awake  [x] Oriented to person/place/time [x]Able to follow commands      Eyes:  EOM    []  Normal  [] Abnormal-  Sclera  []  Normal  [] Abnormal -         Discharge []  None visible  [] Abnormal -    HENT:   [x] Normocephalic, atraumatic. [] Abnormal   [] Mouth/Throat: Mucous membranes are moist.     External Ears [] Normal  [] Abnormal-     Neck: [] No visualized mass     Pulmonary/Chest: [x] Respiratory effort normal.  [x] No visualized signs of difficulty breathing or respiratory distress        [] Abnormal-      Musculoskeletal:   [] Normal gait with no signs of ataxia         [] Normal range of motion of neck        [] Abnormal-       Neurological:        [] No Facial Asymmetry (Cranial nerve 7 motor function) (limited exam to video visit)          [] No gaze palsy        [] Abnormal-         Skin:        [x] No significant exanthematous lesions or discoloration noted on facial skin         [] Abnormal-            Psychiatric:       [] Normal Affect [] No Hallucinations        [] Abnormal-     Other pertinent observable physical exam findings-     ASSESSMENT/PLAN:    See HPI and scanned forms. 1. Pain in both lower extremities  Advised pt to wear orthopedic footwear as directed. 2. Localized edema  Advised pt to wear BLE compression stockings as directed. Recommended that he follow a low salt diet and to try to elevated his BLE as much as possible. 3. Chronic bilateral low back pain without sciatica  Advised pt to wear orthopedic footwear as directed. Return if symptoms worsen or fail to improve. Rafael Cates, was evaluated through a synchronous (real-time) audio-video encounter. The patient (or guardian if applicable) is aware that this is a billable service, which includes applicable co-pays. This Virtual Visit was conducted with patient's (and/or legal guardian's) consent.  The visit was conducted pursuant to the emergency declaration under the [facility], [facility] authority and the [employer] and [employer] Act. Patient identification was verified, and a caregiver was present when appropriate. The patient was located at Home: [address] [address]. Provider was located at [facility] (Appt Dept): [address]  [address] [place] [id number] [facility] [address]. Total time spent on this encounter:  15 min    --GALLO Barahona CNP on 9/28/2022 at 3:10 PM    An electronic signature was used to authenticate this note.

## 2022-12-02 DIAGNOSIS — L30.9 ECZEMA, UNSPECIFIED TYPE: Primary | ICD-10-CM

## 2022-12-02 RX ORDER — TRIAMCINOLONE ACETONIDE 1 MG/G
CREAM TOPICAL
Qty: 15 G | Refills: 0 | Status: SHIPPED | OUTPATIENT
Start: 2022-12-02

## 2023-04-24 ENCOUNTER — OFFICE VISIT (OUTPATIENT)
Dept: FAMILY MEDICINE CLINIC | Age: 23
End: 2023-04-24
Payer: COMMERCIAL

## 2023-04-24 VITALS
DIASTOLIC BLOOD PRESSURE: 64 MMHG | HEART RATE: 94 BPM | SYSTOLIC BLOOD PRESSURE: 134 MMHG | HEIGHT: 62 IN | OXYGEN SATURATION: 98 % | BODY MASS INDEX: 38.09 KG/M2 | WEIGHT: 207 LBS

## 2023-04-24 DIAGNOSIS — N92.6 MISSED MENSES: ICD-10-CM

## 2023-04-24 DIAGNOSIS — Z00.00 HEALTHCARE MAINTENANCE: Primary | ICD-10-CM

## 2023-04-24 LAB
CONTROL: YES
PREGNANCY TEST URINE, POC: NORMAL

## 2023-04-24 PROCEDURE — 99395 PREV VISIT EST AGE 18-39: CPT | Performed by: NURSE PRACTITIONER

## 2023-04-24 PROCEDURE — 99213 OFFICE O/P EST LOW 20 MIN: CPT | Performed by: NURSE PRACTITIONER

## 2023-04-24 PROCEDURE — 81025 URINE PREGNANCY TEST: CPT | Performed by: NURSE PRACTITIONER

## 2023-04-24 SDOH — ECONOMIC STABILITY: FOOD INSECURITY: WITHIN THE PAST 12 MONTHS, YOU WORRIED THAT YOUR FOOD WOULD RUN OUT BEFORE YOU GOT MONEY TO BUY MORE.: NEVER TRUE

## 2023-04-24 SDOH — ECONOMIC STABILITY: FOOD INSECURITY: WITHIN THE PAST 12 MONTHS, THE FOOD YOU BOUGHT JUST DIDN'T LAST AND YOU DIDN'T HAVE MONEY TO GET MORE.: NEVER TRUE

## 2023-04-24 SDOH — ECONOMIC STABILITY: INCOME INSECURITY: HOW HARD IS IT FOR YOU TO PAY FOR THE VERY BASICS LIKE FOOD, HOUSING, MEDICAL CARE, AND HEATING?: NOT HARD AT ALL

## 2023-04-24 NOTE — PROGRESS NOTES
2023    Shital Staton (:  2000) is a 20 y.o. female, here for a preventive medicine evaluation. Pt is here today for her routine annual physical.    She is . Sexually active and is trying to get pregnant. Her LNMP was 3/23/23-3/29/23 and she is 5 days late - closely monitors her menstrual cycle with an luh on her phone. States that she is always very regular. She took a home pregnancy test 3 days ago and it was negative. Had her new pt appt with Dr. Lay Siu 2022. Pap smear was normal.     Declines vaccines today. Patient Active Problem List   Diagnosis    Hx of migraines       Review of Systems   All other systems reviewed and are negative. Prior to Visit Medications    Medication Sig Taking? Authorizing Provider   Multiple Vitamins-Minerals (WOMENS MULTIVITAMIN PO) Take by mouth daily Yes Historical Provider, MD   Levalbuterol Tartrate (XOPENEX HFA IN) Inhale into the lungs  Yes Historical Provider, MD   triamcinolone (KENALOG) 0.1 % cream Apply small amount 2 times daily  Patient not taking: Reported on 2023  GALLO Verdin - CNP   propranolol (INDERAL) 10 MG tablet Take 1 tablet by mouth in the morning and 1 tablet at noon and 1 tablet before bedtime. Patient not taking: Reported on 2023  Jimmie Motley MD   rizatriptan (MAXALT-MLT) 10 MG disintegrating tablet Take 1 tablet by mouth as needed for Migraine May repeat in 2 hours if needed not more than two per 24 hrs. Patient not taking: Reported on 2023  Jimmie Motley MD        No Known Allergies    Past Medical History:   Diagnosis Date    Abnormal Pap smear of cervix 2020    Asthma        No past surgical history on file.     Social History     Socioeconomic History    Marital status:      Spouse name: Not on file    Number of children: Not on file    Years of education: Not on file    Highest education level: Not on file   Occupational History    Not on file   Tobacco

## 2023-04-25 ENCOUNTER — TELEPHONE (OUTPATIENT)
Dept: FAMILY MEDICINE CLINIC | Age: 23
End: 2023-04-25

## 2023-04-25 DIAGNOSIS — N92.6 MISSED MENSES: Primary | ICD-10-CM

## 2023-04-25 DIAGNOSIS — Z12.4 CERVICAL CANCER SCREENING: ICD-10-CM

## 2023-04-25 DIAGNOSIS — Z00.00 HEALTHCARE MAINTENANCE: ICD-10-CM

## 2023-04-25 LAB
B-HCG SERPL EIA 3RD IS-ACNC: <5 MIU/ML
TSH SERPL DL<=0.005 MIU/L-ACNC: 1.39 UIU/ML (ref 0.27–4.2)

## 2023-04-25 NOTE — TELEPHONE ENCOUNTER
Patient called back in I did inform her to get scheduled with OBGYN office per you. She stated she couldn't get in with Dr. William Awan. Wanting to know if YOU would do her pap? She said she is confused and scared and wants to figure this out.

## 2023-04-25 NOTE — TELEPHONE ENCOUNTER
Yes, as I told her, I can do her pap smear, but I would like for her to f/u with an OB/Gyn for missed menses and concerns with infertility. I would try [facility] to see if they can get her in sooner. I will place the referral order. Please give pt info. Thank you.

## 2023-06-26 ENCOUNTER — TRANSCRIBE ORDERS (OUTPATIENT)
Dept: ADMINISTRATIVE | Age: 23
End: 2023-06-26

## 2023-06-26 DIAGNOSIS — N97.0 INFERTILITY, ANOVULATION: Primary | ICD-10-CM

## 2023-06-27 ENCOUNTER — HOSPITAL ENCOUNTER (OUTPATIENT)
Dept: GENERAL RADIOLOGY | Age: 23
Discharge: HOME | End: 2023-06-27
Attending: OBSTETRICS & GYNECOLOGY
Payer: COMMERCIAL

## 2023-06-27 DIAGNOSIS — N97.0 INFERTILITY, ANOVULATION: ICD-10-CM

## 2023-06-27 PROCEDURE — 58340 CATHETER FOR HYSTEROGRAPHY: CPT

## 2023-06-27 PROCEDURE — 6360000004 HC RX CONTRAST MEDICATION: Performed by: OBSTETRICS & GYNECOLOGY

## 2023-06-27 PROCEDURE — 74740 X-RAY FEMALE GENITAL TRACT: CPT

## 2023-06-27 RX ADMIN — IOVERSOL 100 ML: 741 INJECTION INTRA-ARTERIAL; INTRAVENOUS at 09:46

## 2024-08-15 ENCOUNTER — OFFICE VISIT (OUTPATIENT)
Dept: INTERNAL MEDICINE CLINIC | Age: 24
End: 2024-08-15
Payer: COMMERCIAL

## 2024-08-15 VITALS
SYSTOLIC BLOOD PRESSURE: 110 MMHG | WEIGHT: 216 LBS | BODY MASS INDEX: 39.75 KG/M2 | TEMPERATURE: 97.2 F | DIASTOLIC BLOOD PRESSURE: 74 MMHG | HEIGHT: 62 IN

## 2024-08-15 DIAGNOSIS — Z13.220 SCREENING CHOLESTEROL LEVEL: ICD-10-CM

## 2024-08-15 DIAGNOSIS — Z13.1 DIABETES MELLITUS SCREENING: ICD-10-CM

## 2024-08-15 DIAGNOSIS — Z13.9 SCREENING DUE: ICD-10-CM

## 2024-08-15 DIAGNOSIS — E66.09 CLASS 2 OBESITY DUE TO EXCESS CALORIES WITHOUT SERIOUS COMORBIDITY WITH BODY MASS INDEX (BMI) OF 39.0 TO 39.9 IN ADULT: Primary | ICD-10-CM

## 2024-08-15 PROCEDURE — 99214 OFFICE O/P EST MOD 30 MIN: CPT | Performed by: NURSE PRACTITIONER

## 2024-08-15 RX ORDER — SWAB
1000 SWAB, NON-MEDICATED MISCELLANEOUS DAILY
COMMUNITY

## 2024-08-15 SDOH — ECONOMIC STABILITY: FOOD INSECURITY: WITHIN THE PAST 12 MONTHS, YOU WORRIED THAT YOUR FOOD WOULD RUN OUT BEFORE YOU GOT MONEY TO BUY MORE.: NEVER TRUE

## 2024-08-15 SDOH — ECONOMIC STABILITY: FOOD INSECURITY: WITHIN THE PAST 12 MONTHS, THE FOOD YOU BOUGHT JUST DIDN'T LAST AND YOU DIDN'T HAVE MONEY TO GET MORE.: NEVER TRUE

## 2024-08-15 SDOH — ECONOMIC STABILITY: INCOME INSECURITY: HOW HARD IS IT FOR YOU TO PAY FOR THE VERY BASICS LIKE FOOD, HOUSING, MEDICAL CARE, AND HEATING?: NOT HARD AT ALL

## 2024-08-15 ASSESSMENT — PATIENT HEALTH QUESTIONNAIRE - PHQ9
SUM OF ALL RESPONSES TO PHQ QUESTIONS 1-9: 0
SUM OF ALL RESPONSES TO PHQ QUESTIONS 1-9: 0
1. LITTLE INTEREST OR PLEASURE IN DOING THINGS: NOT AT ALL
SUM OF ALL RESPONSES TO PHQ QUESTIONS 1-9: 0
SUM OF ALL RESPONSES TO PHQ9 QUESTIONS 1 & 2: 0
2. FEELING DOWN, DEPRESSED OR HOPELESS: NOT AT ALL
SUM OF ALL RESPONSES TO PHQ QUESTIONS 1-9: 0

## 2024-08-15 ASSESSMENT — ENCOUNTER SYMPTOMS
VOMITING: 0
SHORTNESS OF BREATH: 0
CONSTIPATION: 0
BLOOD IN STOOL: 0
WHEEZING: 0
COUGH: 0
EYE DISCHARGE: 0
NAUSEA: 0
DIARRHEA: 0
ABDOMINAL PAIN: 0

## 2024-08-15 NOTE — PROGRESS NOTES
08/15/24    Asuncion Goodman  24 y.o.  female      Chief Complaint   Patient presents with    Establish Care         HPI:   Pt presents to establish with me due to previous PCP moving.  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  She gained 100 #.  She went on a keto diet and worked out excessively in 2020. She lost down to 190. Now she has gained back to 216 as the above plan is not sustainable.    Review of Systems   Constitutional:  Negative for fever.   HENT:  Negative for congestion.    Eyes:  Negative for discharge.   Respiratory:  Negative for cough, shortness of breath and wheezing.    Cardiovascular:  Negative for chest pain, palpitations and leg swelling.   Gastrointestinal:  Negative for abdominal pain, blood in stool, constipation, diarrhea, nausea and vomiting.   Genitourinary:  Negative for dysuria and hematuria.   Musculoskeletal:  Negative for myalgias.   Skin:  Negative for rash.   Neurological:  Negative for dizziness.   Psychiatric/Behavioral:  The patient is not nervous/anxious.        Physical Exam  Constitutional:       General: She is not in acute distress.     Appearance: She is not diaphoretic.   HENT:      Right Ear: External ear normal.      Left Ear: External ear normal.      Mouth/Throat:      Pharynx: No oropharyngeal exudate.   Eyes:      General: No scleral icterus.        Right eye: No discharge.         Left eye: No discharge.   Neck:      Thyroid: No thyromegaly.   Cardiovascular:      Rate and Rhythm: Normal rate and regular rhythm.      Heart sounds: Normal heart sounds. No murmur heard.     No friction rub. No gallop.   Pulmonary:      Effort: Pulmonary effort is normal.      Breath sounds: Normal breath sounds. No wheezing.   Abdominal:      General: Bowel sounds are normal. There is no distension.      Palpations: Abdomen is soft.

## 2024-08-16 ENCOUNTER — TELEPHONE (OUTPATIENT)
Dept: ADMINISTRATIVE | Age: 24
End: 2024-08-16

## 2024-08-16 DIAGNOSIS — E66.09 CLASS 2 OBESITY DUE TO EXCESS CALORIES WITHOUT SERIOUS COMORBIDITY WITH BODY MASS INDEX (BMI) OF 39.0 TO 39.9 IN ADULT: ICD-10-CM

## 2024-08-16 DIAGNOSIS — Z13.9 SCREENING DUE: ICD-10-CM

## 2024-08-16 LAB
ALBUMIN SERPL-MCNC: 4.4 G/DL (ref 3.4–5)
ALBUMIN/GLOB SERPL: 2 {RATIO} (ref 1.1–2.2)
ALP SERPL-CCNC: 56 U/L (ref 40–129)
ALT SERPL-CCNC: 19 U/L (ref 10–40)
ANION GAP SERPL CALCULATED.3IONS-SCNC: 15 MMOL/L (ref 3–16)
AST SERPL-CCNC: 15 U/L (ref 15–37)
BASOPHILS # BLD: 0 K/UL (ref 0–0.2)
BASOPHILS NFR BLD: 0.6 %
BILIRUB SERPL-MCNC: 0.3 MG/DL (ref 0–1)
BUN SERPL-MCNC: 13 MG/DL (ref 7–20)
CALCIUM SERPL-MCNC: 9.3 MG/DL (ref 8.3–10.6)
CHLORIDE SERPL-SCNC: 101 MMOL/L (ref 99–110)
CHOLEST SERPL-MCNC: 214 MG/DL (ref 0–199)
CO2 SERPL-SCNC: 23 MMOL/L (ref 21–32)
CREAT SERPL-MCNC: 0.7 MG/DL (ref 0.6–1.1)
DEPRECATED RDW RBC AUTO: 13.2 % (ref 12.4–15.4)
EOSINOPHIL # BLD: 0.1 K/UL (ref 0–0.6)
EOSINOPHIL NFR BLD: 1.1 %
GFR SERPLBLD CREATININE-BSD FMLA CKD-EPI: >90 ML/MIN/{1.73_M2}
GLUCOSE P FAST SERPL-MCNC: 74 MG/DL (ref 70–99)
HCT VFR BLD AUTO: 39.4 % (ref 36–48)
HDLC SERPL-MCNC: 45 MG/DL (ref 40–60)
HGB BLD-MCNC: 13.2 G/DL (ref 12–16)
LDL CHOLESTEROL: 152 MG/DL
LYMPHOCYTES # BLD: 2.8 K/UL (ref 1–5.1)
LYMPHOCYTES NFR BLD: 35.4 %
MCH RBC QN AUTO: 30.6 PG (ref 26–34)
MCHC RBC AUTO-ENTMCNC: 33.6 G/DL (ref 31–36)
MCV RBC AUTO: 90.9 FL (ref 80–100)
MONOCYTES # BLD: 0.5 K/UL (ref 0–1.3)
MONOCYTES NFR BLD: 5.8 %
NEUTROPHILS # BLD: 4.5 K/UL (ref 1.7–7.7)
NEUTROPHILS NFR BLD: 57.1 %
PLATELET # BLD AUTO: 218 K/UL (ref 135–450)
PMV BLD AUTO: 9 FL (ref 5–10.5)
POTASSIUM SERPL-SCNC: 4 MMOL/L (ref 3.5–5.1)
PROT SERPL-MCNC: 6.6 G/DL (ref 6.4–8.2)
RBC # BLD AUTO: 4.33 M/UL (ref 4–5.2)
SODIUM SERPL-SCNC: 139 MMOL/L (ref 136–145)
TRIGL SERPL-MCNC: 84 MG/DL (ref 0–150)
TSH SERPL DL<=0.005 MIU/L-ACNC: 1.24 UIU/ML (ref 0.27–4.2)
VLDLC SERPL CALC-MCNC: 17 MG/DL
WBC # BLD AUTO: 7.8 K/UL (ref 4–11)

## 2024-08-16 NOTE — TELEPHONE ENCOUNTER
Submitted PA for Wegovy 0.25MG/0.5ML auto-injectors  Via CMM (Key: [id number]) STATUS: PENDING        Follow up done daily; if no decision with in three days we will refax.  If another three days goes by with no decision will call the insurance for status.

## 2024-08-17 LAB
HCV AB SERPL QL IA: NORMAL
HIV 1+2 AB+HIV1 P24 AG SERPL QL IA: NORMAL
HIV 2 AB SERPL QL IA: NORMAL
HIV1 AB SERPL QL IA: NORMAL
HIV1 P24 AG SERPL QL IA: NORMAL

## 2024-08-19 ENCOUNTER — PATIENT MESSAGE (OUTPATIENT)
Dept: INTERNAL MEDICINE CLINIC | Age: 24
End: 2024-08-19

## 2024-09-09 DIAGNOSIS — E66.09 CLASS 2 OBESITY DUE TO EXCESS CALORIES WITHOUT SERIOUS COMORBIDITY WITH BODY MASS INDEX (BMI) OF 39.0 TO 39.9 IN ADULT: Primary | ICD-10-CM

## 2024-09-19 ENCOUNTER — OFFICE VISIT (OUTPATIENT)
Dept: INTERNAL MEDICINE CLINIC | Age: 24
End: 2024-09-19
Payer: COMMERCIAL

## 2024-09-19 VITALS
SYSTOLIC BLOOD PRESSURE: 128 MMHG | TEMPERATURE: 97 F | BODY MASS INDEX: 37.19 KG/M2 | DIASTOLIC BLOOD PRESSURE: 62 MMHG | WEIGHT: 205 LBS

## 2024-09-19 DIAGNOSIS — E66.09 CLASS 2 OBESITY DUE TO EXCESS CALORIES WITHOUT SERIOUS COMORBIDITY WITH BODY MASS INDEX (BMI) OF 39.0 TO 39.9 IN ADULT: Primary | ICD-10-CM

## 2024-09-19 DIAGNOSIS — Z13.220 SCREENING CHOLESTEROL LEVEL: ICD-10-CM

## 2024-09-19 DIAGNOSIS — Z13.1 DIABETES MELLITUS SCREENING: ICD-10-CM

## 2024-09-19 PROCEDURE — 99214 OFFICE O/P EST MOD 30 MIN: CPT | Performed by: NURSE PRACTITIONER

## 2024-09-19 RX ORDER — LANOLIN ALCOHOL/MO/W.PET/CERES
1000 CREAM (GRAM) TOPICAL DAILY
COMMUNITY

## 2024-09-19 ASSESSMENT — ENCOUNTER SYMPTOMS
CONSTIPATION: 0
EYE DISCHARGE: 0
ABDOMINAL PAIN: 0
BLOOD IN STOOL: 0
DIARRHEA: 0
VOMITING: 0
NAUSEA: 0
SHORTNESS OF BREATH: 0
WHEEZING: 0
COUGH: 0

## 2024-10-06 DIAGNOSIS — E66.812 CLASS 2 OBESITY DUE TO EXCESS CALORIES WITHOUT SERIOUS COMORBIDITY WITH BODY MASS INDEX (BMI) OF 39.0 TO 39.9 IN ADULT: ICD-10-CM

## 2024-10-06 DIAGNOSIS — E66.09 CLASS 2 OBESITY DUE TO EXCESS CALORIES WITHOUT SERIOUS COMORBIDITY WITH BODY MASS INDEX (BMI) OF 39.0 TO 39.9 IN ADULT: ICD-10-CM

## 2024-10-07 ENCOUNTER — TELEPHONE (OUTPATIENT)
Dept: INTERNAL MEDICINE CLINIC | Age: 24
End: 2024-10-07

## 2024-10-07 NOTE — TELEPHONE ENCOUNTER
Cannot find Wegovy 0.5, called several pharmacies, medication is on backorder.     Her pharmacy does have the 1.0, should she go up in the dose?     [facility] is [facility].

## 2024-10-08 DIAGNOSIS — E66.812 CLASS 2 OBESITY DUE TO EXCESS CALORIES WITHOUT SERIOUS COMORBIDITY WITH BODY MASS INDEX (BMI) OF 39.0 TO 39.9 IN ADULT: Primary | ICD-10-CM

## 2024-10-08 DIAGNOSIS — E66.09 CLASS 2 OBESITY DUE TO EXCESS CALORIES WITHOUT SERIOUS COMORBIDITY WITH BODY MASS INDEX (BMI) OF 39.0 TO 39.9 IN ADULT: Primary | ICD-10-CM

## 2024-10-17 NOTE — PROGRESS NOTES
10/18/24    Asuncion Goodman  24 y.o.  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  She gained 100 #.  She went on a keto diet and worked out excessively in 2020. She lost down to 190. Now she has gained back to 216 as the above plan is not sustainable.    9/19/24--reported appetite decreased.  TODAY:  NUTRITION:presents for first time nutritional counseling today  ACTIVITY:walking 1 mile/day; wts 2-3 times a week; takes stairs instead of elevator at work  BEHAVIOUR:continues to be mindful of good choices  MEDICAL THERAPY: started semaglutide in [place]g and titrated to current dose of 1 mg (she has had one dose of the 1 mg)  Initial nausea was reduced with increasing water.    Wt Readings from Last 3 Encounters:   10/18/24 89.8 kg (198 lb)   09/19/24 93 kg (205 lb)   08/15/24 98 kg (216 lb)      Review of Systems   Constitutional:  Negative for fever.   HENT:  Negative for congestion.    Eyes:  Negative for discharge.   Respiratory:  Negative for cough, shortness of breath and wheezing.    Cardiovascular:  Negative for chest pain, palpitations and leg swelling.   Gastrointestinal:  Negative for abdominal pain, blood in stool, constipation, diarrhea, nausea and vomiting.   Genitourinary:  Negative for dysuria and hematuria.   Musculoskeletal:  Negative for myalgias.   Skin:  Negative for rash.   Neurological:  Negative for dizziness.   Psychiatric/Behavioral:  The patient is not nervous/anxious.        Physical Exam  Constitutional:       General: She is not in acute distress.     Appearance: She is not diaphoretic.   HENT:      Right Ear: External ear normal.      Left Ear: External ear normal.      Mouth/Throat:      Pharynx: No oropharyngeal exudate.   Eyes:      General: No scleral icterus.

## 2024-10-18 ENCOUNTER — OFFICE VISIT (OUTPATIENT)
Dept: INTERNAL MEDICINE CLINIC | Age: 24
End: 2024-10-18
Payer: COMMERCIAL

## 2024-10-18 VITALS
SYSTOLIC BLOOD PRESSURE: 120 MMHG | TEMPERATURE: 97 F | BODY MASS INDEX: 35.92 KG/M2 | WEIGHT: 198 LBS | DIASTOLIC BLOOD PRESSURE: 76 MMHG

## 2024-10-18 DIAGNOSIS — E66.09 CLASS 2 OBESITY DUE TO EXCESS CALORIES WITHOUT SERIOUS COMORBIDITY WITH BODY MASS INDEX (BMI) OF 39.0 TO 39.9 IN ADULT: Primary | ICD-10-CM

## 2024-10-18 DIAGNOSIS — E66.812 CLASS 2 OBESITY DUE TO EXCESS CALORIES WITHOUT SERIOUS COMORBIDITY WITH BODY MASS INDEX (BMI) OF 39.0 TO 39.9 IN ADULT: Primary | ICD-10-CM

## 2024-10-18 PROCEDURE — 99214 OFFICE O/P EST MOD 30 MIN: CPT | Performed by: NURSE PRACTITIONER

## 2024-10-18 ASSESSMENT — ENCOUNTER SYMPTOMS
DIARRHEA: 0
BLOOD IN STOOL: 0
WHEEZING: 0
NAUSEA: 0
CONSTIPATION: 0
SHORTNESS OF BREATH: 0
VOMITING: 0
COUGH: 0
ABDOMINAL PAIN: 0
EYE DISCHARGE: 0

## 2024-11-07 ENCOUNTER — OFFICE VISIT (OUTPATIENT)
Dept: INTERNAL MEDICINE CLINIC | Age: 24
End: 2024-11-07

## 2024-11-07 VITALS — BODY MASS INDEX: 34.65 KG/M2 | DIASTOLIC BLOOD PRESSURE: 68 MMHG | WEIGHT: 191 LBS | SYSTOLIC BLOOD PRESSURE: 120 MMHG

## 2024-11-07 DIAGNOSIS — E66.812 CLASS 2 OBESITY DUE TO EXCESS CALORIES WITHOUT SERIOUS COMORBIDITY WITH BODY MASS INDEX (BMI) OF 39.0 TO 39.9 IN ADULT: Primary | ICD-10-CM

## 2024-11-07 DIAGNOSIS — E66.09 CLASS 2 OBESITY DUE TO EXCESS CALORIES WITHOUT SERIOUS COMORBIDITY WITH BODY MASS INDEX (BMI) OF 39.0 TO 39.9 IN ADULT: Primary | ICD-10-CM

## 2024-11-07 ASSESSMENT — ENCOUNTER SYMPTOMS
NAUSEA: 0
DIARRHEA: 0
VOMITING: 0
BLOOD IN STOOL: 0
SHORTNESS OF BREATH: 0
EYE DISCHARGE: 0
ABDOMINAL PAIN: 0
WHEEZING: 0
CONSTIPATION: 0
COUGH: 0

## 2024-11-07 NOTE — PROGRESS NOTES
and [employer]    Interpersonal Safety     Feel physically or emotionally unsafe where currently live: Not on file     Harm by anyone: Not on file     Emotionally Harmed: Not on file   Housing Stability: Unknown (8/15/2024)    Housing Stability Vital Sign     Unable to Pay for Housing in the Last Year: Not on file     Number of Times Moved in the Last Year: Not on file     Homeless in the Last Year: No       Family History   Problem Relation Age of Onset    Other Mother         migraines    Hypertension Mother     No Known Problems Father     Migraines Sister     Migraines Sister     No Known Problems Brother     Cancer Maternal Grandmother         skin    Diabetes Maternal Grandfather     Celiac Disease Maternal Grandfather     No Known Problems Paternal Grandmother     No Known Problems Paternal Grandfather        Past Medical History:   Diagnosis Date    Abnormal Pap smear of cervix 09/2020    Asthma     Migraines                       Ruth Ventura, APRN - CNP

## 2024-11-20 DIAGNOSIS — E66.812 CLASS 2 OBESITY DUE TO EXCESS CALORIES WITHOUT SERIOUS COMORBIDITY WITH BODY MASS INDEX (BMI) OF 39.0 TO 39.9 IN ADULT: ICD-10-CM

## 2024-11-20 DIAGNOSIS — E66.812 CLASS 2 OBESITY DUE TO EXCESS CALORIES WITHOUT SERIOUS COMORBIDITY WITH BODY MASS INDEX (BMI) OF 39.0 TO 39.9 IN ADULT: Primary | ICD-10-CM

## 2024-11-20 DIAGNOSIS — E66.09 CLASS 2 OBESITY DUE TO EXCESS CALORIES WITHOUT SERIOUS COMORBIDITY WITH BODY MASS INDEX (BMI) OF 39.0 TO 39.9 IN ADULT: Primary | ICD-10-CM

## 2024-11-20 DIAGNOSIS — E66.09 CLASS 2 OBESITY DUE TO EXCESS CALORIES WITHOUT SERIOUS COMORBIDITY WITH BODY MASS INDEX (BMI) OF 39.0 TO 39.9 IN ADULT: ICD-10-CM

## 2024-11-20 NOTE — TELEPHONE ENCOUNTER
Refill request for WEGOVY medication.     Name of Pharmacy- [facility]      Last visit - 11-7-2024     Pending visit - 12-    Last refill - 10-8-2024      Medication Contract signed -   Last Oarrs ran-         Additional Comments

## 2024-11-25 ENCOUNTER — TELEPHONE (OUTPATIENT)
Dept: INTERNAL MEDICINE CLINIC | Age: 24
End: 2024-11-25

## 2024-11-25 NOTE — TELEPHONE ENCOUNTER
Patient called and stated that her Wagovy would not be covered under her insurance.    She states she needs something similar to a prior auth to continue to have it approved.    Please advise,

## 2024-11-27 NOTE — TELEPHONE ENCOUNTER
LVM explaining to patient to have her [facility] pharmacy fax us the prior auth form so we can send it to [facility] prior auth dept. And then we can go from there to get it approved. Prior auth is due to medication strength increased.

## 2024-12-11 ENCOUNTER — OFFICE VISIT (OUTPATIENT)
Dept: INTERNAL MEDICINE CLINIC | Age: 24
End: 2024-12-11

## 2024-12-11 VITALS
DIASTOLIC BLOOD PRESSURE: 60 MMHG | TEMPERATURE: 97 F | BODY MASS INDEX: 34.29 KG/M2 | SYSTOLIC BLOOD PRESSURE: 118 MMHG | WEIGHT: 189 LBS

## 2024-12-11 DIAGNOSIS — E66.812 CLASS 2 OBESITY DUE TO EXCESS CALORIES WITHOUT SERIOUS COMORBIDITY WITH BODY MASS INDEX (BMI) OF 39.0 TO 39.9 IN ADULT: Primary | ICD-10-CM

## 2024-12-11 DIAGNOSIS — E66.09 CLASS 2 OBESITY DUE TO EXCESS CALORIES WITHOUT SERIOUS COMORBIDITY WITH BODY MASS INDEX (BMI) OF 39.0 TO 39.9 IN ADULT: Primary | ICD-10-CM

## 2024-12-11 ASSESSMENT — ENCOUNTER SYMPTOMS
CONSTIPATION: 0
VOMITING: 0
WHEEZING: 0
BLOOD IN STOOL: 0
EYE DISCHARGE: 0
ABDOMINAL PAIN: 0
COUGH: 0
NAUSEA: 0
SHORTNESS OF BREATH: 0
DIARRHEA: 0

## 2024-12-11 NOTE — PROGRESS NOTES
12/11/24    Asuncion Goodman  24 y.o.  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  She gained 100 #.  She went on a keto diet and worked out excessively in 2020. She lost down to 190. Now she has gained back to 216 as the above plan is not sustainable. BMI was 39.19  Semaglutide was started.  Initial nausea resolved with increased water     9/19/24-nutritional counseling was provided with a focus on portin control and a reminder to not make the process any harder than it needs to be  Semaglutide was increased to 0.5 mg   OCT wt was 198 on semaglutide 1 mg weekly  11/7/24  Wt 191---waist 35\"  Semaglutide was continued at 1 mg weekly    TODAY:  Wt:189 # BMI 34.29  waist 35\"    NUTRITION:most of the time following healthy plate [id number]%  Uses a high protein yogurt drink to maintain eating according to the clock  Challenge--  disappointed that top is shrinking faster than bottom   Thanksgiving with holiday parties at work.    She also traveled 2 weeks.    One triumph was that her legs didn't hurt so badly even though she was walking 6 miles at [place]. Normally she would have had to stop and rest but this time she didn't have to.    ACTIVITY: hasn't been able to works out with wts. But contnue to walk 1/2 mile from car to work and has increased steps at work with the increased business of holiday parties.     BEHAVIOUR: mindful that cooking at home usually results in better choices. She can walk through bakery aisle and be ok but if it comes home she eats it.  Also able now to stop and say \"am I hungry or bored\" and to get a glass of water instead of indulging in mindless eating.     MED THERAPY: no issues with semalgutide She is on the 1 mg dose.  Denies any SEs      Review of Systems   Constitutional:  Negative for fever.

## 2024-12-26 NOTE — TELEPHONE ENCOUNTER
Refill request for WEGOVY medication.     Name of Pharmacy- [facility]      Last visit - 12/11/24     Pending visit - 1/2/25    Last refill -11/25/24      Medication Contract signed -   Last Oarrs ran-         Additional Comments

## 2024-12-27 RX ORDER — SEMAGLUTIDE 1.7 MG/.75ML
1.7 INJECTION, SOLUTION SUBCUTANEOUS
Qty: 4 ML | Refills: 0 | Status: SHIPPED | OUTPATIENT
Start: 2024-12-27

## 2024-12-27 NOTE — TELEPHONE ENCOUNTER
Additional Comments Ruth.  Patient states at last appointment you and discussed increasing her Wegovy to 1.7 mg.  Dr. Enriquez filled it for the 1 mg.  Can you resend RX for the 1.7 mg?     Pended       Refill request for  medication.     WEGOVY 1.7 MG /0.75    Name of Pharmacy- [facility]       Last visit - 12/11/2024     Pending visit - 01/02/2025    Last refill -12/27/2024

## 2025-01-01 NOTE — PROGRESS NOTES
01/02/25    Asuncion Goodman  24 y.o.  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  She gained 100 #.  She went on a keto diet and worked out excessively in 2020. She lost down to 190. Now she has gained back to 216 as the above plan is not sustainable. BMI was 39.19  Semaglutide was started.  Initial nausea resolved with increased water     9/19/24-nutritional counseling was provided with a focus on portin control and a reminder to not make the process any harder than it needs to be  Semaglutide was increased to 0.5 mg   OCT wt was 198 on semaglutide 1 mg weekly  11/7/24  Wt 191---waist 35\"  Semaglutide was continued at 1 mg weekly  DEC: wt 189  BMI 34.29  waist 35:      TODAY:   Wt: 181 # BMI 32.84  waist 31.25\"     NUTRITION:most of the time following healthy plate enoeugketg-48-43%  Uses a high protein yogurt drink to maintain eating according to the clock  Challenge--holiday         ACTIVITY: working out 2-3 times a week. But contnue to walk 1/2 mile from car to work and has increased steps at work with the increased business of holiday parties.  She is able to walk easier with the wt loss.     BEHAVIOUR: mindful that cooking at home usually results in better choices. She can walk through bakery aisle and be ok but if it comes home she eats it.  Also able now to stop and say \"am I hungry or bored\" and to get a glass of water instead of indulging in mindless eating.      MED THERAPY: no issues with semalgutide She is on the 1 mg dose.  Denies any SEs  Per insurance cost will be increasing but they will cover zepbound at a  lower cost.            No results found for: \"CHOL\"  No results found for: \"TRIG\"  Lab Results   Component Value Date    HDL 45 08/16/2024     No components found for: \"LDLCHOLESTEROL\", \"LDLCALC\"  Lab Results

## 2025-01-02 ENCOUNTER — OFFICE VISIT (OUTPATIENT)
Dept: INTERNAL MEDICINE CLINIC | Age: 25
End: 2025-01-02

## 2025-01-02 VITALS
BODY MASS INDEX: 32.84 KG/M2 | WEIGHT: 181 LBS | DIASTOLIC BLOOD PRESSURE: 60 MMHG | TEMPERATURE: 97 F | SYSTOLIC BLOOD PRESSURE: 102 MMHG

## 2025-01-02 DIAGNOSIS — E66.09 CLASS 2 OBESITY DUE TO EXCESS CALORIES WITHOUT SERIOUS COMORBIDITY WITH BODY MASS INDEX (BMI) OF 39.0 TO 39.9 IN ADULT: Primary | ICD-10-CM

## 2025-01-02 DIAGNOSIS — E66.812 CLASS 2 OBESITY DUE TO EXCESS CALORIES WITHOUT SERIOUS COMORBIDITY WITH BODY MASS INDEX (BMI) OF 39.0 TO 39.9 IN ADULT: Primary | ICD-10-CM

## 2025-01-02 ASSESSMENT — ENCOUNTER SYMPTOMS
NAUSEA: 0
ABDOMINAL PAIN: 0
DIARRHEA: 0
WHEEZING: 0
COUGH: 0
VOMITING: 0
CONSTIPATION: 0
EYE DISCHARGE: 0
BLOOD IN STOOL: 0
SHORTNESS OF BREATH: 0

## 2025-01-02 ASSESSMENT — PATIENT HEALTH QUESTIONNAIRE - PHQ9
2. FEELING DOWN, DEPRESSED OR HOPELESS: NOT AT ALL
1. LITTLE INTEREST OR PLEASURE IN DOING THINGS: NOT AT ALL
SUM OF ALL RESPONSES TO PHQ QUESTIONS 1-9: 0
SUM OF ALL RESPONSES TO PHQ9 QUESTIONS 1 & 2: 0
SUM OF ALL RESPONSES TO PHQ QUESTIONS 1-9: 0

## 2025-01-09 NOTE — TELEPHONE ENCOUNTER
[facility]

## 2025-01-09 NOTE — TELEPHONE ENCOUNTER
Patient states that she can afford the 200 a month currently. However if the dosage goes up and the price increases she cannot afford it at that point.       Medication pended and pharmacy changed to [facility].

## 2025-01-09 NOTE — TELEPHONE ENCOUNTER
We can have the medication compounded at [facility].  Costs are around $200/month and goes up a little with each dose increase.  I will order it for her if that is what she wants to do.    Ruth Ventura, ANP-BC

## 2025-01-09 NOTE — TELEPHONE ENCOUNTER
Patient states that her insurance is no longer going to cover Wegovy. She states that tshe was able to talk with someone and was told they will no longer cover any of the injectable medications. They will only cover oral medications Orlistat, Contrave, and Qsymia    Please advise

## 2025-01-13 ENCOUNTER — TELEPHONE (OUTPATIENT)
Dept: INTERNAL MEDICINE CLINIC | Age: 25
End: 2025-01-13

## 2025-01-13 NOTE — TELEPHONE ENCOUNTER
Patient is calling today stating she took a pregnancy test and it was positive.  She usually takes her Wegovy on the weekend but held this last dose.    She would like advise on taking Wegovy with her pregnancy.  She would like to know if she should call her OB    Please review and advise.     [phone] (home)

## 2025-01-13 NOTE — TELEPHONE ENCOUNTER
It safety is unknown whether to use Wegovy during pregnancy. Recommend no more use until discussion with OB.

## 2025-01-14 RX ORDER — SEMAGLUTIDE 1.7 MG/.75ML
1.7 INJECTION, SOLUTION SUBCUTANEOUS
Qty: 4 ML | Refills: 0 | Status: SHIPPED | OUTPATIENT
Start: 2025-01-14

## 2025-01-23 ENCOUNTER — OFFICE VISIT (OUTPATIENT)
Dept: INTERNAL MEDICINE CLINIC | Age: 25
End: 2025-01-23

## 2025-01-23 VITALS
DIASTOLIC BLOOD PRESSURE: 50 MMHG | TEMPERATURE: 97.2 F | BODY MASS INDEX: 33.02 KG/M2 | OXYGEN SATURATION: 100 % | HEART RATE: 85 BPM | WEIGHT: 182 LBS | SYSTOLIC BLOOD PRESSURE: 110 MMHG

## 2025-01-23 DIAGNOSIS — N91.2 AMENORRHEA: ICD-10-CM

## 2025-01-23 DIAGNOSIS — E66.811 OBESITY (BMI 30.0-34.9): ICD-10-CM

## 2025-01-23 DIAGNOSIS — N30.01 ACUTE CYSTITIS WITH HEMATURIA: Primary | ICD-10-CM

## 2025-01-23 LAB
BILIRUBIN, POC: ABNORMAL
BLOOD URINE, POC: ABNORMAL
CLARITY, POC: ABNORMAL
COLOR, POC: ABNORMAL
CONTROL: ABNORMAL
GLUCOSE URINE, POC: ABNORMAL MG/DL
KETONES, POC: 80 MG/DL
LEUKOCYTE EST, POC: ABNORMAL
NITRITE, POC: ABNORMAL
PH, POC: 5
PREGNANCY TEST URINE, POC: POSITIVE
PROTEIN, POC: ABNORMAL MG/DL
SPECIFIC GRAVITY, POC: 1.02
UROBILINOGEN, POC: 0.2 MG/DL

## 2025-01-23 RX ORDER — NITROFURANTOIN 25; 75 MG/1; MG/1
100 CAPSULE ORAL 2 TIMES DAILY
Qty: 14 CAPSULE | Refills: 0 | Status: SHIPPED | OUTPATIENT
Start: 2025-01-23 | End: 2025-01-30

## 2025-01-23 SDOH — ECONOMIC STABILITY: FOOD INSECURITY: WITHIN THE PAST 12 MONTHS, YOU WORRIED THAT YOUR FOOD WOULD RUN OUT BEFORE YOU GOT MONEY TO BUY MORE.: NEVER TRUE

## 2025-01-23 SDOH — ECONOMIC STABILITY: FOOD INSECURITY: WITHIN THE PAST 12 MONTHS, THE FOOD YOU BOUGHT JUST DIDN'T LAST AND YOU DIDN'T HAVE MONEY TO GET MORE.: NEVER TRUE

## 2025-01-23 ASSESSMENT — ENCOUNTER SYMPTOMS
CONSTIPATION: 0
EYE DISCHARGE: 0
COUGH: 0
NAUSEA: 0
DIARRHEA: 0
VOMITING: 0
SHORTNESS OF BREATH: 0
BLOOD IN STOOL: 0
ABDOMINAL PAIN: 0
WHEEZING: 0

## 2025-01-23 NOTE — PROGRESS NOTES
01/23/25    Asuncion Goodman  24 y.o.  female      Chief Complaint   Patient presents with    Weight Management         HPI:   Pt mistakenly came to office today for wt loss appt which was scheduled for next week.    OBESITY: We started a wt loss program together in Sept 2024.  She has lost a total of 34#.  She has followed healthy plate guidelines, been mindful of good choices and exercised.    They had been trying to have a baby for 3 years and spouse was having testosterone treatment. They agreed to actively stop trying until she could focus on losing the excess wt.  However, she took her last wegovy shot Jan 4th. The 11th she realized she was late and the 12th took a home test that was positive.      GYN has been at [place].  She has an appt the first week of Feb    When pg test was run here, urine was so cloudy and foul smelling that MA did a UA.  It was positive for leuks, nitrites, and blood. She denies any symtpoms.    Review of Systems   Constitutional:  Negative for fever.   HENT:  Negative for congestion.    Eyes:  Negative for discharge.   Respiratory:  Negative for cough, shortness of breath and wheezing.    Cardiovascular:  Negative for chest pain, palpitations and leg swelling.   Gastrointestinal:  Negative for abdominal pain, blood in stool, constipation, diarrhea, nausea and vomiting.   Genitourinary:  Negative for dysuria and hematuria.   Musculoskeletal:  Negative for myalgias.   Skin:  Negative for rash.   Neurological:  Negative for dizziness.   Psychiatric/Behavioral:  The patient is not nervous/anxious.        Physical Exam  Constitutional:       General: She is not in acute distress.     Appearance: She is not diaphoretic.   HENT:      Right Ear: External ear normal.      Left Ear: External ear normal.      Mouth/Throat:      Pharynx: No oropharyngeal exudate.   Eyes:      General: No scleral icterus.        Right eye: No discharge.         Left eye: No discharge.   Neck:      Thyroid: